# Patient Record
Sex: FEMALE | Race: WHITE | Employment: FULL TIME | URBAN - METROPOLITAN AREA
[De-identification: names, ages, dates, MRNs, and addresses within clinical notes are randomized per-mention and may not be internally consistent; named-entity substitution may affect disease eponyms.]

---
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
## 2022-01-16 ENCOUNTER — HOSPITAL ENCOUNTER (EMERGENCY)
Facility: CLINIC | Age: 22
Discharge: HOME | End: 2022-01-17
Admitting: PHYSICIAN ASSISTANT
Payer: COMMERCIAL

## 2022-01-16 DIAGNOSIS — B27.90 MONONUCLEOSIS: ICD-10-CM

## 2022-01-16 DIAGNOSIS — R16.1 SPLENOMEGALY: ICD-10-CM

## 2022-01-16 DIAGNOSIS — R74.8 ELEVATED LIVER ENZYMES: ICD-10-CM

## 2022-01-16 LAB
ALBUMIN SERPL-MCNC: 3.3 G/DL (ref 3.5–5)
ALBUMIN UR-MCNC: NEGATIVE MG/DL
ALP SERPL-CCNC: 339 U/L (ref 45–120)
ALT SERPL W P-5'-P-CCNC: 393 U/L (ref 0–45)
ANION GAP SERPL CALCULATED.3IONS-SCNC: 9 MMOL/L (ref 5–18)
APPEARANCE UR: CLEAR
AST SERPL W P-5'-P-CCNC: 200 U/L (ref 0–40)
BILIRUB SERPL-MCNC: 0.9 MG/DL (ref 0–1)
BILIRUB UR QL STRIP: NEGATIVE
BUN SERPL-MCNC: 7 MG/DL (ref 8–22)
C REACTIVE PROTEIN LHE: 0.5 MG/DL (ref 0–0.8)
CALCIUM SERPL-MCNC: 9 MG/DL (ref 8.5–10.5)
CHLORIDE BLD-SCNC: 105 MMOL/L (ref 98–107)
CO2 SERPL-SCNC: 24 MMOL/L (ref 22–31)
COLOR UR AUTO: YELLOW
CREAT SERPL-MCNC: 0.75 MG/DL (ref 0.6–1.1)
GFR SERPL CREATININE-BSD FRML MDRD: >90 ML/MIN/1.73M2
GLUCOSE BLD-MCNC: 115 MG/DL (ref 70–125)
GLUCOSE UR STRIP-MCNC: NEGATIVE MG/DL
HCG UR QL: NEGATIVE
HGB UR QL STRIP: NEGATIVE
INR PPP: 1.03 (ref 0.85–1.15)
KETONES UR STRIP-MCNC: NEGATIVE MG/DL
LEUKOCYTE ESTERASE UR QL STRIP: NEGATIVE
MUCOUS THREADS #/AREA URNS LPF: PRESENT /LPF
NITRATE UR QL: NEGATIVE
PH UR STRIP: 6 [PH] (ref 5–7)
POTASSIUM BLD-SCNC: 3.7 MMOL/L (ref 3.5–5)
PROT SERPL-MCNC: 7.4 G/DL (ref 6–8)
RBC URINE: 0 /HPF
SODIUM SERPL-SCNC: 138 MMOL/L (ref 136–145)
SP GR UR STRIP: 1.01 (ref 1–1.03)
SQUAMOUS EPITHELIAL: 4 /HPF
UROBILINOGEN UR STRIP-MCNC: <2 MG/DL
WBC URINE: 1 /HPF

## 2022-01-16 PROCEDURE — 99285 EMERGENCY DEPT VISIT HI MDM: CPT | Mod: 25

## 2022-01-16 PROCEDURE — 80053 COMPREHEN METABOLIC PANEL: CPT | Performed by: EMERGENCY MEDICINE

## 2022-01-16 PROCEDURE — 85014 HEMATOCRIT: CPT | Performed by: EMERGENCY MEDICINE

## 2022-01-16 PROCEDURE — 81001 URINALYSIS AUTO W/SCOPE: CPT | Performed by: EMERGENCY MEDICINE

## 2022-01-16 PROCEDURE — 86140 C-REACTIVE PROTEIN: CPT | Performed by: EMERGENCY MEDICINE

## 2022-01-16 PROCEDURE — 85610 PROTHROMBIN TIME: CPT | Performed by: EMERGENCY MEDICINE

## 2022-01-16 PROCEDURE — 36415 COLL VENOUS BLD VENIPUNCTURE: CPT | Performed by: EMERGENCY MEDICINE

## 2022-01-16 PROCEDURE — 81025 URINE PREGNANCY TEST: CPT | Performed by: PHYSICIAN ASSISTANT

## 2022-01-16 ASSESSMENT — MIFFLIN-ST. JEOR: SCORE: 1372.36

## 2022-01-16 ASSESSMENT — ENCOUNTER SYMPTOMS
FATIGUE: 1
DIARRHEA: 0
DYSURIA: 0
FEVER: 1
SHORTNESS OF BREATH: 0
VOMITING: 0
SORE THROAT: 0
FREQUENCY: 0
HEMATURIA: 0

## 2022-01-17 ENCOUNTER — APPOINTMENT (OUTPATIENT)
Dept: CT IMAGING | Facility: CLINIC | Age: 22
End: 2022-01-17
Payer: COMMERCIAL

## 2022-01-17 VITALS
HEART RATE: 97 BPM | SYSTOLIC BLOOD PRESSURE: 114 MMHG | OXYGEN SATURATION: 98 % | HEIGHT: 64 IN | RESPIRATION RATE: 16 BRPM | WEIGHT: 135 LBS | TEMPERATURE: 98.4 F | DIASTOLIC BLOOD PRESSURE: 59 MMHG | BODY MASS INDEX: 23.05 KG/M2

## 2022-01-17 LAB
BASOPHILS # BLD MANUAL: 0 10E3/UL (ref 0–0.2)
BASOPHILS NFR BLD MANUAL: 0 %
EOSINOPHIL # BLD MANUAL: 0 10E3/UL (ref 0–0.7)
EOSINOPHIL NFR BLD MANUAL: 0 %
ERYTHROCYTE [DISTWIDTH] IN BLOOD BY AUTOMATED COUNT: 13.4 % (ref 10–15)
HCT VFR BLD AUTO: 41.9 % (ref 35–47)
HGB BLD-MCNC: 13.8 G/DL (ref 11.7–15.7)
LYMPHOCYTES # BLD MANUAL: 8.5 10E3/UL (ref 0.8–5.3)
LYMPHOCYTES NFR BLD MANUAL: 83 %
MCH RBC QN AUTO: 29.1 PG (ref 26.5–33)
MCHC RBC AUTO-ENTMCNC: 32.9 G/DL (ref 31.5–36.5)
MCV RBC AUTO: 88 FL (ref 78–100)
MONOCYTES # BLD MANUAL: 0.2 10E3/UL (ref 0–1.3)
MONOCYTES NFR BLD MANUAL: 2 %
NEUTROPHILS # BLD MANUAL: 1.5 10E3/UL (ref 1.6–8.3)
NEUTROPHILS NFR BLD MANUAL: 15 %
PLAT MORPH BLD: ABNORMAL
PLATELET # BLD AUTO: 168 10E3/UL (ref 150–450)
RBC # BLD AUTO: 4.75 10E6/UL (ref 3.8–5.2)
RBC MORPH BLD: ABNORMAL
VARIANT LYMPHS BLD QL SMEAR: PRESENT
WBC # BLD AUTO: 10.2 10E3/UL (ref 4–11)

## 2022-01-17 PROCEDURE — 250N000011 HC RX IP 250 OP 636: Performed by: RADIOLOGY

## 2022-01-17 PROCEDURE — 74177 CT ABD & PELVIS W/CONTRAST: CPT

## 2022-01-17 RX ORDER — IOPAMIDOL 755 MG/ML
100 INJECTION, SOLUTION INTRAVASCULAR ONCE
Status: COMPLETED | OUTPATIENT
Start: 2022-01-17 | End: 2022-01-17

## 2022-01-17 RX ADMIN — IOPAMIDOL 100 ML: 755 INJECTION, SOLUTION INTRAVENOUS at 00:47

## 2022-01-17 NOTE — ED PROVIDER NOTES
EMERGENCY DEPARTMENT ENCOUNTER      NAME: Esme Galvan  AGE: 21 year old female  YOB: 2000  MRN: [id number]  EVALUATION DATE & TIME: 1/16/2022 10:55 PM    PCP: Aline Orellana    ED PROVIDER: Venessa Mendez PA-C      Chief Complaint   Patient presents with     Abdominal Pain       FINAL IMPRESSION:  1. Mononucleosis    2. Elevated liver enzymes    3. Splenomegaly        MEDICAL DECISION MAKING:    Pertinent Labs & Imaging studies reviewed. (See chart for details)  21 year old female presents to the Emergency Department for evaluation of LUQ abdominal pain onset tonight. She was diagnosed with mono 5 days ago after noting lymphadenopathy in her neck with associated fatigue and intermittent fevers. She denies any trauma to her abdomen. No vomiting, diarrhea, urinary symptoms, shortness of breath, chest pain, sore throat.    Vitals reviewed and unremarkable. On physical exam she has faint scleral icterus. No jaundice of skin. Mild LUQ tenderness with palpable splenomegaly. No rebound or guarding. Differential diagnosis includes but not limited to splenomegaly secondary to viral illness, splenic rupture, colitis/diverticulitis, ectopic pregnancy, pyelonephritis, ureterolithiasis, pancreatitis, hepatobiliary etiology.    No leukocytosis though lymphocytosis noted on differential. CRP WNL. Hgb 13.8. Platelets and INR WNL. , , Alk phos 339, to be expected with mono. Total bilirubin WNL. BUN 7, Cr 0.75. No electrolyte derangements. UPT negative. UA without evidence of infection. CT with mild to moderate splenic enlargement. Spleen is otherwise negative. Mildly prominent bilateral groin lymph nodes are likely reactive in nature. No worrisome adenopathy. We discussed supportive management at home. Importance of no contact sports. Avoid tylenol if able and to use ibuprofen for fevers. She will need follow up with PCP for repeat LFTs in 1-2 weeks to ensure they are normalizing. Discussed return  precautions and patient discharged home in stable condition.     0 minutes of critical care time     ED COURSE:  11:10 PM I met with the patient, obtained history, performed an initial exam, and discussed options and plan for diagnostics and treatment here in the ED.  1:12 AM I rechecked and updated the patient on results. Patient discharged after being provided with extensive anticipatory guidance and given return precautions, importance of PCP follow-up emphasized.    At the conclusion of the encounter I discussed the results of all of the tests and the disposition. The questions were answered. The patient acknowledged understanding and was agreeable with the care plan.     MEDICATIONS GIVEN IN THE EMERGENCY:  Medications   iopamidol (ISOVUE-370) solution 100 mL (100 mLs Intravenous Given 1/17/22 0047)       NEW PRESCRIPTIONS STARTED AT TODAY'S ER VISIT  Discharge Medication List as of 1/17/2022  1:20 AM               =================================================================    HPI:    Patient information was obtained from: Patient    Use of Interpretor: N/A      Esme Galvan is a 21 year old female with a pertinent history of paroxysmal SVT who presents to this ED via walk-in for evaluation of abdominal pain.    Patient reports she was diagnosed with mono on Wednesday (5 days ago). She had gone in because of swollen lymph nodes in her neck and tested positive. She states she had mono symptoms about a week before diagnosis. Patient reports she came in tonight because the last few hours she has had discomfort on her left side, especially when inhaling deeply. Patient endorses low-grade fevers mainly last week and fatigue. She denies chance of pregnancy. She denies vomiting, diarrhea, sore throat, shortness of breath, chest pain, urinary symptoms, vaginal discharge, or any other current complaints.      REVIEW OF SYSTEMS:  Review of Systems   Constitutional: Positive for fatigue and fever.   HENT: Negative  for sore throat.    Respiratory: Negative for cough and shortness of breath.    Cardiovascular: Negative for chest pain.   Gastrointestinal: Positive for abdominal pain (LUQ). Negative for diarrhea and vomiting.   Genitourinary: Negative for dysuria, frequency, hematuria and vaginal discharge.   Skin: Negative for rash.   Hematological: Positive for adenopathy.   All other systems reviewed and are negative.       PAST MEDICAL HISTORY:  Past Medical History:   Diagnosis Date     Asthma 11/5/2012     Gastroesophageal reflux disease      Paroxysmal supraventricular tachycardia (H)        PAST SURGICAL HISTORY:  Past Surgical History:   Procedure Laterality Date     EP STUDY CHILD N/A 7/26/2017    Procedure: EP STUDY CHILD;  EP Study;  Surgeon: Edita Barnard MD;  Location: [facility] OR     GI SURGERY  2015    EGD           CURRENT MEDICATIONS:    No current facility-administered medications for this encounter.    Current Outpatient Medications:      ACETAMINOPHEN PO, Take 325 mg by mouth every 6 hours as needed for pain, Disp: , Rfl:      diphenhydrAMINE (BENADRYL) 25 MG tablet, Take 25 mg by mouth every 6 hours as needed for itching or allergies, Disp: , Rfl:      Famotidine (PEPCID AC PO), Take 1 tablet by mouth daily as needed, Disp: , Rfl:      IBUPROFEN PO, Take 200 mg by mouth every 6 hours as needed for moderate pain, Disp: , Rfl:      KURVELO 0.15-30 MG-MCG tablet, Take 1 tablet by mouth daily, Disp: , Rfl:      levalbuterol (XOPENEX) 0.31 MG/3ML nebulizer solution, Take 1 ampule by nebulization every 4 hours as needed., Disp: , Rfl:      sertraline (ZOLOFT) 25 MG tablet, Take 25 mg by mouth daily, Disp: , Rfl:      sodium chloride (OCEAN) 0.65 % nasal spray, Spray 1 spray into both nostrils daily as needed for congestion Reported on 4/28/2017, Disp: , Rfl:       ALLERGIES:  Allergies   Allergen Reactions     Seasonal Allergies      Is going through allergy testing     Albuterol Palpitations     Heart rate  "increase     Augmentin GI Disturbance       FAMILY HISTORY:  No family history on file.    SOCIAL HISTORY:   Social History     Socioeconomic History     Marital status: Single     Spouse name: Not on file     Number of children: Not on file     Years of education: Not on file     Highest education level: Not on file   Occupational History     Not on file   Tobacco Use     Smoking status: Never Smoker     Smokeless tobacco: Never Used   Substance and Sexual Activity     Alcohol use: Not on file     Drug use: Not on file     Sexual activity: Not on file   Other Topics Concern     Not on file   Social History Narrative     Not on file     Social Determinants of Health     Financial Resource Strain: Not on file   Food Insecurity: Not on file   Transportation Needs: Not on file   Physical Activity: Not on file   Stress: Not on file   Social Connections: Not on file   Intimate Partner Violence: Not on file   Housing Stability: Not on file       VITALS:  Patient Vitals for the past 24 hrs:   BP Temp Temp src Pulse Resp SpO2 Height Weight   01/17/22 0119 114/59 -- -- 97 16 98 % -- --   01/16/22 2225 124/69 98.4  F (36.9  C) Temporal 87 16 97 % 1.626 m (5' 4\") 61.2 kg (135 lb)       PHYSICAL EXAM  Constitutional: Well developed, Well nourished, NAD  HENT: Normocephalic, Atraumatic, Bilateral external ears normal, Oropharynx normal, mucous membranes moist, Nose normal.   Neck: Normal range of motion, No tenderness, Supple, No stridor.  Eyes: PERRL, EOMI, Conjunctiva normal, No discharge. Faint scleral icterus.  Respiratory: Normal breath sounds, No respiratory distress, No wheezing, Speaks full sentences easily. No cough.  Cardiovascular: Normal heart rate, Regular rhythm, No murmurs, No rubs, No gallops. Chest wall nontender.  GI: Soft, mild LUQ tenderness with palpable splenomegaly, No flank tenderness. No rebound or guarding.  Musculoskeletal: 2+ DP pulses. No edema. No cyanosis, No clubbing. Good range of motion in all " major joints. No tenderness to palpation or major deformities noted. No tenderness of the CTLS spine.   Integument: Warm, Dry, No erythema, No rash. No petechiae. No jaundice.  Neurologic: Alert & oriented x 3, Normal motor function, Normal sensory function, No focal deficits noted. Normal gait.  Psychiatric: Affect normal, Judgment normal, Mood normal. Cooperative.    LAB:  All pertinent labs reviewed and interpreted.  Recent Results (from the past 24 hour(s))   INR    Collection Time: 01/16/22 11:17 PM   Result Value Ref Range    INR 1.03 0.85 - 1.15   Comprehensive metabolic panel    Collection Time: 01/16/22 11:17 PM   Result Value Ref Range    Sodium 138 136 - 145 mmol/L    Potassium 3.7 3.5 - 5.0 mmol/L    Chloride 105 98 - 107 mmol/L    Carbon Dioxide (CO2) 24 22 - 31 mmol/L    Anion Gap 9 5 - 18 mmol/L    Urea Nitrogen 7 (L) 8 - 22 mg/dL    Creatinine 0.75 0.60 - 1.10 mg/dL    Calcium 9.0 8.5 - 10.5 mg/dL    Glucose 115 70 - 125 mg/dL    Alkaline Phosphatase 339 (H) 45 - 120 U/L     (H) 0 - 40 U/L     (H) 0 - 45 U/L    Protein Total 7.4 6.0 - 8.0 g/dL    Albumin 3.3 (L) 3.5 - 5.0 g/dL    Bilirubin Total 0.9 0.0 - 1.0 mg/dL    GFR Estimate >90 >60 mL/min/1.73m2   CRP inflammation    Collection Time: 01/16/22 11:17 PM   Result Value Ref Range    CRP 0.5 0.0-<0.8 mg/dL   CBC with platelets and differential    Collection Time: 01/16/22 11:17 PM   Result Value Ref Range    WBC Count 10.2 4.0 - 11.0 10e3/uL    RBC Count 4.75 3.80 - 5.20 10e6/uL    Hemoglobin 13.8 11.7 - 15.7 g/dL    Hematocrit 41.9 35.0 - 47.0 %    MCV 88 78 - 100 fL    MCH 29.1 26.5 - 33.0 pg    MCHC 32.9 31.5 - 36.5 g/dL    RDW 13.4 10.0 - 15.0 %    Platelet Count 168 150 - 450 10e3/uL   Manual Differential    Collection Time: 01/16/22 11:17 PM   Result Value Ref Range    % Neutrophils 15 %    % Lymphocytes 83 %    % Monocytes 2 %    % Eosinophils 0 %    % Basophils 0 %    Absolute Neutrophils 1.5 (L) 1.6 - 8.3 10e3/uL    Absolute  Lymphocytes 8.5 (H) 0.8 - 5.3 10e3/uL    Absolute Monocytes 0.2 0.0 - 1.3 10e3/uL    Absolute Eosinophils 0.0 0.0 - 0.7 10e3/uL    Absolute Basophils 0.0 0.0 - 0.2 10e3/uL    RBC Morphology Confirmed RBC Indices     Platelet Assessment  Automated Count Confirmed. Platelet morphology is normal.     Automated Count Confirmed. Platelet morphology is normal.    Reactive Lymphocytes Present (A) None Seen   UA with Microscopic reflex to Culture    Collection Time: 01/16/22 11:23 PM    Specimen: Urine, Clean Catch   Result Value Ref Range    Color Urine Yellow Colorless, Straw, Light Yellow, Yellow    Appearance Urine Clear Clear    Glucose Urine Negative Negative mg/dL    Bilirubin Urine Negative Negative    Ketones Urine Negative Negative mg/dL    Specific Gravity Urine 1.015 1.001 - 1.030    Blood Urine Negative Negative    pH Urine 6.0 5.0 - 7.0    Protein Albumin Urine Negative Negative mg/dL    Urobilinogen Urine <2.0 <2.0 mg/dL    Nitrite Urine Negative Negative    Leukocyte Esterase Urine Negative Negative    Mucus Urine Present (A) None Seen /LPF    RBC Urine 0 <=2 /HPF    WBC Urine 1 <=5 /HPF    Squamous Epithelials Urine 4 (H) <=1 /HPF   HCG qualitative urine    Collection Time: 01/16/22 11:23 PM   Result Value Ref Range    hCG Urine Qualitative Negative Negative         RADIOLOGY:  Reviewed all pertinent imaging. Please see official radiology report.  CT Abdomen Pelvis w Contrast   Final Result   IMPRESSION:    1.  Mild to moderate splenic enlargement. Spleen is otherwise negative.      2.  No acute bowel findings. Moderate amount of stool in the colon.      3.  Small amount of nonspecific free fluid in the pelvis.          I, Melody Castillo, am serving as a scribe to document services personally performed by Venessa Mendez PA-C based on my observation and the provider's statements to me. I, Venessa Mendez PA-C attest that Melody Castillo is acting in a scribe capacity, has observed my performance of the services and has  documented them in accordance with my direction.    Venessa Mendez PA-C  Emergency Medicine  [facility]  1/16/2022     Venessa Mendez PA-C  01/19/22 1107

## 2022-01-17 NOTE — ED TRIAGE NOTES
The patient presents to the ED with abdominal discomfort and swelling that began this evening. She was diagnosed with mono on Wednesday but reports mono symptoms have been present for a couple of weeks now.

## 2022-01-17 NOTE — DISCHARGE INSTRUCTIONS
Your liver enzymes are elevated and your spleen is enlarged. Both common with mono. More importantly is that your liver enzymes return to normal and your spleen goes back to normal size after the acute mono infection. You should have these repeated in a few weeks. No contact sports and be careful on the ice to avoid blunt trauma to abdomen. If you develop any new or worsening symptoms, return to the emergency department

## 2022-01-19 ASSESSMENT — ENCOUNTER SYMPTOMS
ADENOPATHY: 1
ABDOMINAL PAIN: 1
COUGH: 0

## 2022-04-18 ENCOUNTER — LAB REQUISITION (OUTPATIENT)
Dept: LAB | Facility: CLINIC | Age: 22
End: 2022-04-18
Payer: COMMERCIAL

## 2022-04-18 DIAGNOSIS — R15.2 FECAL URGENCY: ICD-10-CM

## 2022-04-18 DIAGNOSIS — Z30.9 ENCOUNTER FOR CONTRACEPTIVE MANAGEMENT, UNSPECIFIED: ICD-10-CM

## 2022-04-18 LAB — TSH SERPL DL<=0.005 MIU/L-ACNC: 2.66 UIU/ML (ref 0.3–5)

## 2022-04-18 PROCEDURE — 84443 ASSAY THYROID STIM HORMONE: CPT | Mod: ORL | Performed by: PEDIATRICS

## 2022-04-18 PROCEDURE — 87491 CHLMYD TRACH DNA AMP PROBE: CPT | Mod: ORL | Performed by: PEDIATRICS

## 2022-04-18 PROCEDURE — 82784 ASSAY IGA/IGD/IGG/IGM EACH: CPT | Mod: ORL | Performed by: PEDIATRICS

## 2022-04-19 LAB
C TRACH DNA SPEC QL PROBE+SIG AMP: NEGATIVE
N GONORRHOEA DNA SPEC QL NAA+PROBE: NEGATIVE

## 2022-04-20 LAB
GLIADIN IGA SER-ACNC: 1.6 U/ML
GLIADIN IGG SER-ACNC: <0.6 U/ML
IGA SERPL-MCNC: 210 MG/DL (ref 84–499)
TTG IGA SER-ACNC: 0.7 U/ML
TTG IGG SER-ACNC: <0.6 U/ML

## 2022-11-22 NOTE — LETTER
2018      RE: Esme Galvan  [address] [id number]       Richa Vazquez MD   [facility]   [address] [place] [address]      RE: Esme Galvan   MRN: [id number]   : 2000      Dear Dr. Amira Vazquez:      I had the pleasure of seeing your 18-year-old patient, Esme, on 2018, for cardiac rhythm followup.  I have known her for many years, and she first had rapid heartbeat at age 5 years.  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  Esme and her family were not interested in taking the risk of possible AV block and, therefore, catheter ablation was not performed.  In the past year, Esme has really done quite well.  She thinks she has tachycardia once or twice a month and it lasts less than 5 minutes.  Historically, when she would be sick with fever, the tachycardia would last longer, but now that her allergies seem under better control, that has not been an issue.  Her last episode occurred while she was running on the treadmill.  She sat down and was able to take some deep breaths and get it to stop within a couple of minutes.  It has never woken her at night.  It typically does not occur with exercise.  She has never fainted.  She takes oral contraceptives, uses topical steroids, and gets allergy shots.  She has no drug allergies.  She is in 10th grade and gets A's.  She is planning to go to college.  She actually is interested in a career in the medical field.  She enjoys hanging out with her friends, shopping and talking on the phone.  She may go out for track next spring.  There is no new family history.      PHYSICAL EXAMINATION:  An alert, oriented, acyanotic teenager.  Blood pressure is 104/62 with a heart rate of 70.  Weight is 55.7 kg and height 163.5 cm.  Neck is supple without adenopathy.  Mucous membranes are pink.  Chest is clear.  Cardiac exam shows a quiet  precordium with a normal first and physiologically split second heart sound.  There is no murmur.  Liver is not enlarged, and peripheral pulses are present.  There is no peripheral edema.  Neurologic exam is grossly intact.  There is no clubbing or cyanosis.      LABORATORY STUDIES:  Esme had an electrocardiogram today, which showed sinus rhythm at a rate of 65 beats per minute.      IMPRESSION:  Esme has paroxysmal tachycardia.  The frequency of it at this point does not seem to be sufficient for her to need any other treatment.  I did review all treatment options including pharmacologic therapy with Esme and her mom today.  They had a chance to have their questions answered.  We also discussed the natural history of supraventricular tachycardia.  We agreed to talk again next summer, or sooner should any questions or problems arise.      I appreciate the opportunity to participate in Esme's care.  Please let me know if I can provide any other information for you.      Sincerely,      Addie Santoyo MD     Addendum:  There are clinical trials of a medication that can be administered intranasally to terminate SVT within a few minutes.  I will review their status next year.          Addie Santoyo MD     There are no Wet Read(s) to document.

## 2022-12-26 ENCOUNTER — NURSE TRIAGE (OUTPATIENT)
Dept: NURSING | Facility: CLINIC | Age: 22
End: 2022-12-26

## 2022-12-27 NOTE — TELEPHONE ENCOUNTER
"Pt's mom calling with concern for blood in her daughter's vomit. No C2C on file, but daughter is present and gives verbal consent to speak with her mother.    Mo says pt hasn't felt good all day. Felt nauseous after eating breakfast (egg sandwich with burt). Got watery diarrhea around 4pm. Threw up around 5pm. Felt a little better after getting sick. Then threw up just a few minutes ago and thought there was some blood in the emesis. Of note, pt has hx IBS and SVT.    No fever. Had gas pain in lower stomach that has subsided, but still feels bloated. Says she feels weak and a little shaky. No dizziness, no abdominal distention per mom.     Protocol recommends see PCP within 3 days. Stick to clear fluids for now. If no vomiting in 8 hours, can try eating bland foods. Call back instructions discussed. Pt and family verbalized understanding.    Tara Buck, RN, BSN  [facility]   Triage Nurse Advisor           Reason for Disposition    [1] Few streaks of blood in vomit AND [2] occurred one time    Additional Information    Negative: Shock suspected (e.g., cold/pale/clammy skin, too weak to stand, low BP, rapid pulse)    Negative: Difficult to awaken or acting confused (e.g., disoriented, slurred speech)    Negative: Unable to walk, or can only walk with assistance (e.g., requires support)    Negative: Fainted    Negative: [1] Vomited blood AND [2] large amount (example: \"a cup of blood\")    Negative: Rectal bleeding (i.e., bloody stool, blood in stool)    Negative: Sounds like a life-threatening emergency to the triager    Negative: Coughing up blood (i.e., from lungs)    Negative: Weak, dizzy or lightheaded    Negative: [1] Vomited blood AND [2] more than a few streaks of blood    (Exception: few streaks and only occurred once)    (Exception: swallowed blood from a nosebleed or cut in the mouth)    Negative: [1] Vomiting AND [2] contains black (\"coffee ground\") material    Negative: Black or tarry bowel " movements    Negative: [1] Constant abdominal pain AND [2] present > 2 hours    Negative: Recent abdominal injury (within last 3 days)    Negative: Jaundice (yellow eyes) or known cirrhosis of the liver (or history of liver failure or ascites)    Negative: Taking Coumadin (warfarin) or other strong blood thinner, or known bleeding disorder (e.g., thrombocytopenia)    Negative: Pale skin (pallor) of new-onset or worsening    Negative: [1] Drinking very little AND [2] dehydration suspected (e.g., no urine > 12 hours, very dry mouth, very lightheaded)    Negative: Age > 60 years    Negative: High-risk adult (e.g., diabetes mellitus, brain tumor, V-P shunt, inguinal hernia, chemotherapy)    Negative: Unhealthy alcohol use, known or suspected    Negative: Patient sounds very sick or weak to the triager    Negative: [1] Vomiting AND [2] abdomen looks much more swollen than usual    Negative: Taking any of the following medications: digoxin (Lanoxin), lithium, theophylline, phenytoin (Dilantin)    Negative: Vomiting (without blood) persists > 48 hours    Negative: Vomiting a prescription medication    Protocols used: VOMITING BLOOD-A-AH

## 2023-01-19 ENCOUNTER — LAB REQUISITION (OUTPATIENT)
Dept: LAB | Facility: CLINIC | Age: 23
End: 2023-01-19
Payer: COMMERCIAL

## 2023-01-19 DIAGNOSIS — Z30.011 ENCOUNTER FOR INITIAL PRESCRIPTION OF CONTRACEPTIVE PILLS: ICD-10-CM

## 2023-01-19 PROCEDURE — 87491 CHLMYD TRACH DNA AMP PROBE: CPT | Mod: ORL | Performed by: PEDIATRICS

## 2023-01-21 LAB
C TRACH DNA SPEC QL PROBE+SIG AMP: NEGATIVE
N GONORRHOEA DNA SPEC QL NAA+PROBE: NEGATIVE

## 2024-05-17 ENCOUNTER — LAB REQUISITION (OUTPATIENT)
Dept: LAB | Facility: CLINIC | Age: 24
End: 2024-05-17
Payer: COMMERCIAL

## 2024-05-17 DIAGNOSIS — Z12.4 ENCOUNTER FOR SCREENING FOR MALIGNANT NEOPLASM OF CERVIX: ICD-10-CM

## 2024-05-17 PROCEDURE — G0145 SCR C/V CYTO,THINLAYER,RESCR: HCPCS | Mod: ORL | Performed by: OBSTETRICS & GYNECOLOGY

## 2024-05-23 LAB
BKR LAB AP GYN ADEQUACY: NORMAL
BKR LAB AP GYN INTERPRETATION: NORMAL
BKR LAB AP HPV REFLEX: NO
BKR LAB AP LMP: NORMAL
BKR LAB AP PREVIOUS ABNL DX: NORMAL
BKR LAB AP PREVIOUS ABNORMAL: NORMAL
PATH REPORT.COMMENTS IMP SPEC: NORMAL
PATH REPORT.COMMENTS IMP SPEC: NORMAL
PATH REPORT.RELEVANT HX SPEC: NORMAL

## 2025-01-04 ENCOUNTER — NURSE TRIAGE (OUTPATIENT)
Dept: NURSING | Facility: CLINIC | Age: 25
End: 2025-01-04
Payer: COMMERCIAL

## 2025-01-04 NOTE — TELEPHONE ENCOUNTER
Triage call  Patient calling to report  she has had diarrhea since 12/31/2024. She als had a little nausea and a low grade fever on the first 2 days.but since it has just been the diarrhea.      Per protocol see PCP with in 24 hours.  Care advice given.  Verbalizes understanding and agrees with plan.    Charisse Clancy RN   [facility] Nurse Advisor  12:12 PM 1/4/2025    Reason for Disposition   [1] SEVERE diarrhea (e.g., 7 or more times / day more than normal) AND [2] present > 24 hours (1 day)    Additional Information   Negative: Shock suspected (e.g., cold/pale/clammy skin, too weak to stand, low BP, rapid pulse)   Negative: Difficult to awaken or acting confused (e.g., disoriented, slurred speech)   Negative: Sounds like a life-threatening emergency to the triager   Negative: Vomiting also present and worse than the diarrhea   Negative: [1] Blood in stool AND [2] without diarrhea   Negative: Diarrhea in a cancer patient who is currently (or recently) receiving chemotherapy or radiation therapy, or cancer patient who has metastatic or end-stage cancer and is receiving palliative care   Negative: Diarrhea begins while taking an antibiotic by mouth (oral antibiotic)   Negative: [1] SEVERE abdominal pain (e.g., excruciating) AND [2] present > 1 hour   Negative: [1] Blood in the stool AND [2] moderate or large amount of blood   Negative: [1] SEVERE abdominal pain AND [2] age > 60 years   Negative: Black or tarry bowel movements  (Exception: Chronic-unchanged black-grey BMs AND is taking iron pills or Pepto-Bismol.)   Negative: [1] Drinking very little AND [2] dehydration suspected (e.g., no urine > 12 hours, very dry mouth, very lightheaded)   Negative: Patient sounds very sick or weak to the triager   Negative: [1] SEVERE diarrhea (e.g., 7 or more times / day more than normal) AND [2] age > 60 years   Negative: [1] Constant abdominal pain AND [2] present > 2 hours   Negative: [1] Fever > 103 F (39.4 C) AND  [2] not able to get the fever down using Fever Care Advice    Protocols used: Diarrhea-A-AH

## (undated) RX ORDER — PROPOFOL 10 MG/ML
INJECTION, EMULSION INTRAVENOUS
Status: DISPENSED
Start: 2017-07-26

## (undated) RX ORDER — ACETAMINOPHEN 80 MG/1
TABLET, CHEWABLE ORAL
Status: DISPENSED
Start: 2017-07-26

## (undated) RX ORDER — HEPARIN SODIUM 1000 [USP'U]/ML
INJECTION, SOLUTION INTRAVENOUS; SUBCUTANEOUS
Status: DISPENSED
Start: 2017-07-26

## (undated) RX ORDER — LIDOCAINE HYDROCHLORIDE 10 MG/ML
INJECTION, SOLUTION EPIDURAL; INFILTRATION; INTRACAUDAL; PERINEURAL
Status: DISPENSED
Start: 2017-07-26

## (undated) RX ORDER — ISOPROTERENOL HYDROCHLORIDE 0.2 MG/ML
INJECTION, SOLUTION INTRAVENOUS
Status: DISPENSED
Start: 2017-07-26

## (undated) RX ORDER — ADENOSINE 3 MG/ML
INJECTION, SOLUTION INTRAVENOUS
Status: DISPENSED
Start: 2017-07-26

## (undated) RX ORDER — ACETAMINOPHEN 325 MG/1
TABLET ORAL
Status: DISPENSED
Start: 2017-07-26

## (undated) RX ORDER — FENTANYL CITRATE 50 UG/ML
INJECTION, SOLUTION INTRAMUSCULAR; INTRAVENOUS
Status: DISPENSED
Start: 2017-07-26

## (undated) RX ORDER — BUPIVACAINE HYDROCHLORIDE 2.5 MG/ML
INJECTION, SOLUTION EPIDURAL; INFILTRATION; INTRACAUDAL
Status: DISPENSED
Start: 2017-07-26

## (undated) RX ORDER — ONDANSETRON 2 MG/ML
INJECTION INTRAMUSCULAR; INTRAVENOUS
Status: DISPENSED
Start: 2017-07-26